# Patient Record
(demographics unavailable — no encounter records)

---

## 2025-03-14 NOTE — REASON FOR VISIT
[Initial Evaluation] : an initial evaluation [FreeTextEntry1] : Left great toe pain [Patient] : patient [Father] : father

## 2025-03-14 NOTE — HISTORY OF PRESENT ILLNESS
[FreeTextEntry1] : 15-year-old female child here for evaluation for Left great toe pain. This pain has been ongoing since February 25, 2025.  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  She participates in jazz, hip-hop, ballet.  She does not do any en pointe.  The pain is mainly on the top of the big toe.  No fever and chills with the pain.  No pain at night.  She does not take any pain medication.  She has been doing some taping which does seem to help.  Does not bother her for gym class.  Sometimes it hurts with stair climbing.Otherwise no other issues or complaints.

## 2025-03-14 NOTE — DATA REVIEWED
[de-identified] : My review and interpretation of the radiologic studies: X-rays of the left foot 3 views reveal

## 2025-03-14 NOTE — PHYSICAL EXAM
[FreeTextEntry1] : This is a pleasant child who is well developed, well nourished in no apparent distress.  The child is awake and alert. Vital signs as documented. There is no acute skin changes or skin lesions and is warm, pink and dry with no evidence of infection.  No palpable lymph nodes. The head is normocephalic, atraumatic with full range of motion of the cervical spine with no pain.  No nucheal rigidity. There are no masses on the neck. Eyes have normal conjunctiva, normal eyelids and pupils which are round and equal. The child has normal ears, normal nose, normal lips, normal teeth, normal gums with no ulcers or lesions. The child has 2+ pulses for bilateral dorsalis pedis, posterior tibialis, and radial. No peripheral edema.  There is normal respiratory effort. The abdomen is soft and nontender with no masses palpated. There is spontaneous movement of bilateral upper and lower extremities.   The pulses are 2+ at both wrists.  Spine shows no deformity, yair of hair or dimples.  The pelvis and shoulders appear to be level when the child is standing.   The child is able to get on and off the examining table without difficulty. The child has normal gait and balance.  No pathologic reflexes noted. Sensation is grossly intact in bilateral upper and lower extremities.  Pulses are 2+ at both feet.  There are no palpable masses, warmth, swelling, tenderness, clubbing, cyanosis, or ecchymosis in bilateral upper and lower extremities.The motor exam is 5/5 of bilateral shoulders, elbows, wrists, and hands. Deep tendon reflexes are 2+ at both knees and ankles with downgoing toes.The child has full range of motion of bilateral hips, knees, ankles, and feet with motor exam of 5/5 of both lower extremities.  The lower extremities are negative for Galeazzi. No apparent limb length discrepancy.  Examination of the left foot reveals that she has some tenderness on the Extensor hallucis longus At the location of the MCP joint.  No tenderness on the plantar aspect of the big toe.  Plantarflexion of the toe causes some pain on the dorsum.  Dorsiflexion of the big toe causes some pain on the dorsum.  I am able to passively flex and extend the toe with minimal discomfort.  No tenderness or pain at the IP joint.  Neurovascularly intact.  Skin intact.

## 2025-03-14 NOTE — ASSESSMENT
[FreeTextEntry1] : 15-year-old female child with what appears to be left foot Extensor hallucis longus tendinitis. Diagnosis and prognosis fully explained and all questions answered. The child reports that she had been involved in multiple competitions along with her dance classes and she has had an increase in terms of activities and an increase in terms of dance activities over the last 2 weeks.  This most likely is a component of overuse phenomenon and should resolve with time.  Dad's been doing the taping which has helped and he will continue to do so to provide some external support.  I have also suggested well-fitting shoes and sneakers to give extra support to the foot and ankle.  Suggested that she take some time off from gym and sports as well as dance for the next 2 weeks to allow this to recover.  Nonsteroidal anti-inflammatory medications for pain relief.  If these conservative measures have proven to be ineffective, then the next step is immobilization of the foot and ankle to allow for full recovery of the tendon.  Immobilization would either be a hard soled shoe or a cam boot.  Typically surgical Measures our last resort.  If the pain persists they will call and return to the office for further evaluation otherwise we will see them on an as-needed basis.  The history was obtained today from the child and parent; given the patient's age and/or the child's mental capacity, the history was unreliable and the parent was used as an independent historian.  Diagnosis and prognosis fully explained and all questions were answered by the physician. Understanding was verbalized. Treatment plan was fully discussed and agreed upon by the child and family.  This note was partially created using voice recognition software and will inherently be subject to errors including those of syntax and sound alike substitutions which may escape proofreading.  In such instances, the original and intended meaning maybe extrapolated by contextual derivation.  A reasonable effort has been made for proofreading its contents, however errors may still remain. If there are any questions or points of clarification needed please do not hesitate to contact my office.